# Patient Record
Sex: MALE | Race: BLACK OR AFRICAN AMERICAN | Employment: FULL TIME | URBAN - METROPOLITAN AREA
[De-identification: names, ages, dates, MRNs, and addresses within clinical notes are randomized per-mention and may not be internally consistent; named-entity substitution may affect disease eponyms.]

---

## 2019-04-17 ENCOUNTER — PATIENT OUTREACH (OUTPATIENT)
Dept: FAMILY MEDICINE CLINIC | Facility: CLINIC | Age: 29
End: 2019-04-17

## 2019-04-17 NOTE — PROGRESS NOTES
Patient is on Dr. Florencio Ward list of patients that need follow up visit for either Well visit, HTN, Diabetes, etc,. ....     Patient was last seen on 11/16/2016     I need a confirmation from patient that he/she is seeing another PCP in order for me to fill out